# Patient Record
Sex: MALE | Race: OTHER | NOT HISPANIC OR LATINO | URBAN - METROPOLITAN AREA
[De-identification: names, ages, dates, MRNs, and addresses within clinical notes are randomized per-mention and may not be internally consistent; named-entity substitution may affect disease eponyms.]

---

## 2018-03-06 ENCOUNTER — EMERGENCY (EMERGENCY)
Facility: HOSPITAL | Age: 33
LOS: 1 days | Discharge: LEFT BEFORE DISCHARGE | End: 2018-03-06
Admitting: EMERGENCY MEDICINE

## 2018-03-06 VITALS
HEIGHT: 68 IN | HEART RATE: 98 BPM | SYSTOLIC BLOOD PRESSURE: 145 MMHG | DIASTOLIC BLOOD PRESSURE: 99 MMHG | TEMPERATURE: 100 F | OXYGEN SATURATION: 97 % | RESPIRATION RATE: 22 BRPM

## 2018-03-06 NOTE — ED ADULT NURSE NOTE - CHIEF COMPLAINT QUOTE
c/o cough, fever, dec appettite, nausea, seen at urgent care on sunday - dx: flu, started on tamiflu on sunday, presents with persistent symptoms, treating fever with motrin    *returns from [facility] 3/2/18

## 2018-03-06 NOTE — ED ADULT TRIAGE NOTE - CHIEF COMPLAINT QUOTE
c/o cough, fever, dec appettite, nausea, seen at urgent care - dx: flu, started on tamiflu on sunday, presents with persistent symptoms, treating fever with motrin    *returns from [facility] 3/2/18

## 2022-04-15 ENCOUNTER — EMERGENCY (EMERGENCY)
Facility: HOSPITAL | Age: 37
LOS: 1 days | Discharge: HOME | End: 2022-04-15
Admitting: EMERGENCY MEDICINE
Payer: OTHER MISCELLANEOUS

## 2022-04-15 VITALS
SYSTOLIC BLOOD PRESSURE: 141 MMHG | DIASTOLIC BLOOD PRESSURE: 95 MMHG | HEART RATE: 91 BPM | OXYGEN SATURATION: 96 % | TEMPERATURE: 98 F | RESPIRATION RATE: 20 BRPM

## 2022-04-15 DIAGNOSIS — S63.501A UNSPECIFIED SPRAIN OF RIGHT WRIST, INITIAL ENCOUNTER: ICD-10-CM

## 2022-04-15 DIAGNOSIS — M25.531 PAIN IN RIGHT WRIST: ICD-10-CM
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
## 2022-04-15 PROCEDURE — 73130 X-RAY EXAM OF HAND: CPT | Mod: 26,RT

## 2022-04-15 PROCEDURE — 99053 MED SERV 10PM-8AM 24 HR FAC: CPT

## 2022-04-15 PROCEDURE — 73110 X-RAY EXAM OF WRIST: CPT | Mod: 26,RT

## 2022-04-15 PROCEDURE — 99283 EMERGENCY DEPT VISIT LOW MDM: CPT | Mod: 25
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
## 2022-04-16 PROCEDURE — 73110 X-RAY EXAM OF WRIST: CPT | Mod: 26,RT

## 2022-04-16 PROCEDURE — 73130 X-RAY EXAM OF HAND: CPT | Mod: 26,RT

## 2022-04-16 RX ORDER — ACETAMINOPHEN 500 MG
650 TABLET ORAL ONCE
Refills: 0 | Status: COMPLETED | OUTPATIENT
Start: 2022-04-16 | End: 2022-04-16

## 2022-04-16 RX ADMIN — Medication 650 MILLIGRAM(S): at 00:24
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
## 2022-04-16 NOTE — ED PROVIDER NOTE - NS ED ROS FT
Review of Systems    Constitutional: (-) fever  Musculoskeletal: (-) neck pain, (-) back pain  Integumentary: (-) rash, (-) edema  Neurological: (-) weakness, (-) numbness
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
## 2022-04-16 NOTE — ED PROVIDER NOTE - PATIENT PORTAL LINK FT
You can access the FollowMyHealth Patient Portal offered by [facility] by registering at the following website: [email or web address]. By joining [employer]’s FollowMyHealth portal, you will also be able to view your health information using other applications (apps) compatible with our system.

## 2022-04-16 NOTE — ED PROVIDER NOTE - PHYSICAL EXAMINATION
Physical Exam    Vital Signs: I have reviewed the initial vital signs.  Constitutional: well-nourished, appears stated age, no acute distress  Cardiovascular: regular rate, regular rhythm, well-perfused extremities, radial pulse +2 and equal b/l  Musculoskeletal: mild wrist ttp, no snuffbox ttp  Integumentary: warm, dry, no rash  Neurologic: median, ulnar and radial nerves in extremities’ motor and sensory functions grossly intact

## 2022-04-16 NOTE — ED PROVIDER NOTE - CARE PROVIDER_API CALL
Vasquez Hernandez)  Orthopaedic Surgery  [address] [place] [address], [address]  [place] [address]  Phone: [phone]  Fax: [phone]  Follow Up Time: 1-3 Days

## 2022-05-16 ENCOUNTER — EMERGENCY (EMERGENCY)
Facility: HOSPITAL | Age: 37
LOS: 1 days | Discharge: HOME | End: 2022-05-16
Attending: EMERGENCY MEDICINE | Admitting: EMERGENCY MEDICINE
Payer: OTHER MISCELLANEOUS

## 2022-05-16 VITALS
HEIGHT: 68 IN | HEART RATE: 92 BPM | SYSTOLIC BLOOD PRESSURE: 137 MMHG | OXYGEN SATURATION: 98 % | DIASTOLIC BLOOD PRESSURE: 92 MMHG | RESPIRATION RATE: 18 BRPM | WEIGHT: 222.67 LBS | TEMPERATURE: 98 F

## 2022-05-16 LAB
ALBUMIN SERPL ELPH-MCNC: 4.3 G/DL — SIGNIFICANT CHANGE UP (ref 3.4–5)
ALP SERPL-CCNC: 45 U/L — SIGNIFICANT CHANGE UP (ref 40–120)
ALT FLD-CCNC: 33 U/L — SIGNIFICANT CHANGE UP (ref 12–42)
ANION GAP SERPL CALC-SCNC: 8 MMOL/L — LOW (ref 9–16)
AST SERPL-CCNC: 22 U/L — SIGNIFICANT CHANGE UP (ref 15–37)
BASOPHILS # BLD AUTO: 0.04 K/UL — SIGNIFICANT CHANGE UP (ref 0–0.2)
BASOPHILS NFR BLD AUTO: 0.7 % — SIGNIFICANT CHANGE UP (ref 0–2)
BILIRUB SERPL-MCNC: 0.3 MG/DL — SIGNIFICANT CHANGE UP (ref 0.2–1.2)
BUN SERPL-MCNC: 23 MG/DL — SIGNIFICANT CHANGE UP (ref 7–23)
CALCIUM SERPL-MCNC: 9.2 MG/DL — SIGNIFICANT CHANGE UP (ref 8.5–10.5)
CHLORIDE SERPL-SCNC: 102 MMOL/L — SIGNIFICANT CHANGE UP (ref 96–108)
CO2 SERPL-SCNC: 29 MMOL/L — SIGNIFICANT CHANGE UP (ref 22–31)
CREAT SERPL-MCNC: 1.15 MG/DL — SIGNIFICANT CHANGE UP (ref 0.5–1.3)
EGFR: 83 ML/MIN/1.73M2 — SIGNIFICANT CHANGE UP
EOSINOPHIL # BLD AUTO: 0.1 K/UL — SIGNIFICANT CHANGE UP (ref 0–0.5)
EOSINOPHIL NFR BLD AUTO: 1.7 % — SIGNIFICANT CHANGE UP (ref 0–6)
GLUCOSE SERPL-MCNC: 90 MG/DL — SIGNIFICANT CHANGE UP (ref 70–99)
HAV IGM SER-ACNC: SIGNIFICANT CHANGE UP
HBV CORE IGM SER-ACNC: SIGNIFICANT CHANGE UP
HBV SURFACE AB SER-ACNC: REACTIVE
HBV SURFACE AG SER-ACNC: SIGNIFICANT CHANGE UP
HBV SURFACE AG SER-ACNC: SIGNIFICANT CHANGE UP
HCT VFR BLD CALC: 41.6 % — SIGNIFICANT CHANGE UP (ref 39–50)
HCV AB S/CO SERPL IA: 0.15 S/CO — SIGNIFICANT CHANGE UP (ref 0–0.99)
HCV AB SERPL-IMP: SIGNIFICANT CHANGE UP
HGB BLD-MCNC: 13.8 G/DL — SIGNIFICANT CHANGE UP (ref 13–17)
HIV 1 & 2 AB SERPL IA.RAPID: SIGNIFICANT CHANGE UP
IMM GRANULOCYTES NFR BLD AUTO: 0.3 % — SIGNIFICANT CHANGE UP (ref 0–1.5)
LYMPHOCYTES # BLD AUTO: 2.23 K/UL — SIGNIFICANT CHANGE UP (ref 1–3.3)
LYMPHOCYTES # BLD AUTO: 37.7 % — SIGNIFICANT CHANGE UP (ref 13–44)
MCHC RBC-ENTMCNC: 28.9 PG — SIGNIFICANT CHANGE UP (ref 27–34)
MCHC RBC-ENTMCNC: 33.2 GM/DL — SIGNIFICANT CHANGE UP (ref 32–36)
MCV RBC AUTO: 87 FL — SIGNIFICANT CHANGE UP (ref 80–100)
MONOCYTES # BLD AUTO: 0.48 K/UL — SIGNIFICANT CHANGE UP (ref 0–0.9)
MONOCYTES NFR BLD AUTO: 8.1 % — SIGNIFICANT CHANGE UP (ref 2–14)
NEUTROPHILS # BLD AUTO: 3.04 K/UL — SIGNIFICANT CHANGE UP (ref 1.8–7.4)
NEUTROPHILS NFR BLD AUTO: 51.5 % — SIGNIFICANT CHANGE UP (ref 43–77)
NRBC # BLD: 0 /100 WBCS — SIGNIFICANT CHANGE UP (ref 0–0)
PLATELET # BLD AUTO: 212 K/UL — SIGNIFICANT CHANGE UP (ref 150–400)
POTASSIUM SERPL-MCNC: 4.4 MMOL/L — SIGNIFICANT CHANGE UP (ref 3.5–5.3)
POTASSIUM SERPL-SCNC: 4.4 MMOL/L — SIGNIFICANT CHANGE UP (ref 3.5–5.3)
PROT SERPL-MCNC: 8.6 G/DL — HIGH (ref 6.4–8.2)
RBC # BLD: 4.78 M/UL — SIGNIFICANT CHANGE UP (ref 4.2–5.8)
RBC # FLD: 12.7 % — SIGNIFICANT CHANGE UP (ref 10.3–14.5)
SODIUM SERPL-SCNC: 139 MMOL/L — SIGNIFICANT CHANGE UP (ref 132–145)
WBC # BLD: 5.91 K/UL — SIGNIFICANT CHANGE UP (ref 3.8–10.5)
WBC # FLD AUTO: 5.91 K/UL — SIGNIFICANT CHANGE UP (ref 3.8–10.5)

## 2022-05-16 PROCEDURE — 99053 MED SERV 10PM-8AM 24 HR FAC: CPT

## 2022-05-16 PROCEDURE — 99284 EMERGENCY DEPT VISIT MOD MDM: CPT

## 2022-05-16 RX ORDER — RALTEGRAVIR 400 MG/1
1 TABLET, FILM COATED ORAL
Qty: 56 | Refills: 0
Start: 2022-05-16 | End: 2022-06-12

## 2022-05-16 RX ORDER — EMTRICITABINE AND TENOFOVIR DISOPROXIL FUMARATE 200; 300 MG/1; MG/1
1 TABLET, FILM COATED ORAL
Qty: 28 | Refills: 0
Start: 2022-05-16 | End: 2022-06-12
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
## 2022-05-16 NOTE — ED PROVIDER NOTE - NSFOLLOWUPINSTRUCTIONS_ED_ALL_ED_FT
Postexposure Prophylaxis    WHAT YOU NEED TO KNOW:    Postexposure prophylaxis (PEP) is medical care given to prevent HIV, hepatitis B, and other diseases. PEP may include first aid, testing, and medicines. Exposure can occur when you have contact with certain body fluids from another person. These fluids include blood, semen, and vaginal fluid. They also include any other body fluid that may have blood in it, such as saliva or urine. You are exposed if these fluids touch an open area of your skin, such as a cut. You also are exposed if the fluids touch a mucus membrane. This is a moist area, such as in the eyes, nose, and mouth. You can be exposed by a needlestick through the skin.     DISCHARGE INSTRUCTIONS:    Medicines:   •Antiretrovirals: These medicines help prevent HIV. They must be taken for 28 days, unless your healthcare provider tells you otherwise. You may be given a starter pack with enough medicine for 1 to 7 days. You may be given medicine for the full 28 days instead. If you receive a starter pack, you must return to your healthcare provider in 1 to 7 days. At this visit, you will receive the rest of the medicine.      •Hepatitis B vaccine: This medicine helps prevent hepatitis B. You will need 3 doses (shots) of the vaccine. The second dose should be taken 1 to 2 months after the first dose. The third dose should be taken 4 to 6 months after the first dose.      •Antibiotics: These are germ-killing medicines to help treat or prevent sexually transmitted diseases, such as gonorrhea.      •Take your medicine as directed. Contact your healthcare provider if you think your medicine is not helping or if you have side effects. Tell him or her if you are allergic to any medicine. Keep a list of the medicines, vitamins, and herbs you take. Include the amounts, and when and why you take them. Bring the list or the pill bottles to follow-up visits. Carry your medicine list with you in case of an emergency.      Follow up with your healthcare provider as directed: If you did not receive any treatment after the exposure, you will need to follow up with your healthcare provider within 1 week. If you were given antiretrovirals, you will need a follow-up visit in about 2 weeks. Further HIV testing will be needed 6 weeks, 3 months, and 6 months after the exposure. You may have HIV testing up to 12 months after the exposure.    Precautions: In case you are infected, you will need to take steps to keep others from getting sick. These steps can help you avoid being exposed again:   •Avoid sex, or have safe sex. Safe sex means having sex only with one person who is not infected and who is only having sex with you. It also means using condoms each time you have sex.       •Avoid injection drug use. If you cannot do this, always use needles that are sterile (germ-free).       •Follow all safety rules at your workplace if you are at risk of exposure. Be sure to use safety equipment as needed.       •Get the hepatitis B vaccine if you have not already.       •Do not breastfeed unless you know you are not infected.       In case of future exposure: If you think you have been exposed, get first aid right away. If you are at work, follow work policy to report the exposure. The type of first aid you need depends on what part of your body was exposed:   •Open skin: Wash the area right away with soap and water. Use a gel hand  if you do not have soap or running water. Do not use anything harsh, such as bleach. Do not squeeze or rub the skin.       •Eyes: Rinse your eyes with water or saline (a salt solution) right away. Be sure to clean well by moving your eyelids with your fingers as you rinse. Keep contact lenses in while rinsing your eyes, then remove and clean them as usual. Avoid soap or other .       •Mouth: Spit out the blood or body fluid right away. Rinse your mouth with water or saline several times. Avoid putting soap or other  in your mouth.       For support and more information: It can be hard to cope if you think you have been exposed to a disease. You may feel scared and concerned about your health. This is normal. It can be helpful to find out all you can about the risk of exposure. Counseling or joining a support group also can help. Talk to your healthcare provider, or contact the following:   •National Center for HIV/AIDS, Viral Hepatitis, STD, and TB Prevention, CDC  Web Address: www.cdc.gov/nchhstp/      •The National Clinicians’ Post-Exposure Prophylaxis Hotline  Web Address: www.ncc.[facility].edu/about_ncc/pepline/        Contact your healthcare provider if:   •You have nausea or diarrhea.      •You are more tired than usual.       •You have new headaches, or you feel dizzy.       •You have trouble sleeping.       •Your eyes or skin turn yellow.       •You are not eating because of appetite loss.       •You are or may be pregnant.       Return to the emergency department if:   •You have a fever.       •You have a rash.       •You have new muscle pain or pain in your back or abdomen.       •You urinate more often than usual, have blood in your urine, or have pain while urinating.       •You are more thirsty than usual.       •You have trouble swallowing or breathing.
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
## 2022-05-16 NOTE — ED PROVIDER NOTE - PATIENT PORTAL LINK FT
You can access the FollowMyHealth Patient Portal offered by [facility] by registering at the following website: [email or web address]. By joining [email or web address]’s FollowMyHealth portal, you will also be able to view your health information using other applications (apps) compatible with our system.

## 2022-05-17 DIAGNOSIS — Z77.21 CONTACT WITH AND (SUSPECTED) EXPOSURE TO POTENTIALLY HAZARDOUS BODY FLUIDS: ICD-10-CM
